# Patient Record
Sex: MALE | Race: WHITE | NOT HISPANIC OR LATINO | Employment: OTHER | URBAN - METROPOLITAN AREA
[De-identification: names, ages, dates, MRNs, and addresses within clinical notes are randomized per-mention and may not be internally consistent; named-entity substitution may affect disease eponyms.]

---

## 2019-11-01 ENCOUNTER — HOSPITAL ENCOUNTER (OUTPATIENT)
Dept: RADIOLOGY | Facility: MEDICAL CENTER | Age: 69
End: 2019-11-01

## 2019-12-11 ENCOUNTER — HOSPITAL ENCOUNTER (OUTPATIENT)
Dept: HOSPITAL 8 - CVU | Age: 69
Discharge: HOME | End: 2019-12-11
Attending: INTERNAL MEDICINE
Payer: MEDICARE

## 2019-12-11 DIAGNOSIS — I35.8: Primary | ICD-10-CM

## 2019-12-11 DIAGNOSIS — Z87.891: ICD-10-CM

## 2019-12-11 PROCEDURE — 93306 TTE W/DOPPLER COMPLETE: CPT

## 2020-01-23 ENCOUNTER — HOSPITAL ENCOUNTER (OUTPATIENT)
Dept: HOSPITAL 8 - CFH | Age: 70
Discharge: HOME | End: 2020-01-23
Attending: INTERNAL MEDICINE
Payer: MEDICARE

## 2020-01-23 DIAGNOSIS — R93.1: ICD-10-CM

## 2020-01-23 DIAGNOSIS — R07.89: Primary | ICD-10-CM

## 2020-01-23 PROCEDURE — 93017 CV STRESS TEST TRACING ONLY: CPT

## 2020-01-23 PROCEDURE — 78452 HT MUSCLE IMAGE SPECT MULT: CPT

## 2020-01-23 PROCEDURE — A9502 TC99M TETROFOSMIN: HCPCS

## 2020-03-09 ENCOUNTER — HOSPITAL ENCOUNTER (OUTPATIENT)
Facility: MEDICAL CENTER | Age: 70
End: 2020-03-09
Attending: SURGERY | Admitting: SURGERY
Payer: MEDICARE

## 2025-01-20 NOTE — PROGRESS NOTES
Subjective  Elder South is a 74 y.o. male who presents today for gross hematuria for 2 days at the end of 2024. No infection at the time. Had kidney stone treated 3 years ago. Had some left CVAT at the time of the hematuria. Has a PSA between 5 and 6 for a couple years.     No family history on file.    Social History     Socioeconomic History    Marital status:    Tobacco Use    Smoking status: Former     Current packs/day: 0.00     Average packs/day: 0.5 packs/day for 40.0 years (20.0 ttl pk-yrs)     Types: Cigarettes     Start date: 1973     Quit date: 2013     Years since quittin.5   Substance and Sexual Activity    Alcohol use: Yes     Comment: 5 per week     Social Drivers of Health     Transportation Needs: No Transportation Needs (2020)    Received from [facility]    PRAPARE - Transportation     Lack of Transportation (Medical): No     Lack of Transportation (Non-Medical): No   Physical Activity: Insufficiently Active (2020)    Received from [facility]    Exercise Vital Sign     Days of Exercise per Week: 3 days     Minutes of Exercise per Session: 10 min       Past Surgical History:   Procedure Laterality Date    CYSTOSCOPY STENT PLACEMENT Left 2016    Procedure: CYSTOSCOPY POSSIBLE STENT PLACEMENT;  Surgeon: Rene Benson M.D.;  Location: [facility];  Service:     URETEROSCOPY Left 2016    Procedure: URETEROSCOPY;  Surgeon: Rene Benson M.D.;  Location: [facility];  Service:     LASERTRIPSY Left 2016    Procedure: LASER LITHOTRIPSY;  Surgeon: Rene Benson M.D.;  Location: [facility];  Service:     OTHER ORTHOPEDIC SURGERY      right heel fx       Past Medical History:   Diagnosis Date    Arthritis     spine    Blood clotting disorder     on blood thinners    Cancer     skin ca basal cell    Cataract     Diverticul disease small and large intestine, no perforati or abscess      Heart burn     Heart valve disease     enlarged right atrium    Hemorrhagic disorder (CMS-HCC) 2010    DVT- left groin    Renal disorder     Urinary bladder disorder        Current Outpatient Medications   Medication Sig    Cholecalciferol 4000 UNITS Tab Take 4,000 Units by mouth every day.    warfarin (COUMADIN) 1 MG Tab Take 3.5 mg by mouth every day.    S-Adenosylmethionine (MATEO-E) 400 MG Tab Take 1 Tab by mouth every day.    Multiple Vitamin (MULTI-VITAMIN PO) Take 1 Tab by mouth every day.    Calcium Carbonate (CALCIUM 600 PO) Take 1 Tab by mouth every day.    alprazolam (XANAX) 1 MG Tab Take 0.5-1 mg by mouth at bedtime as needed for Sleep.    zolpidem (AMBIEN) 10 MG Tab Take 10 mg by mouth at bedtime as needed for Sleep.    temazepam (RESTORIL) 30 MG capsule Take 30 mg by mouth at bedtime as needed for Sleep.    hydrocodone-acetaminophen (VICODIN) 5-500 MG Tab Take 1-2 Tabs by mouth every 8 hours as needed (Old Rx.).    tamsulosin (FLOMAX) 0.4 MG capsule Take 0.4 mg by mouth every bedtime.       No Known Allergies    Objective  There were no vitals taken for this visit.  Physical Exam      Labs:   CBC   Lab Results   Component Value Date/Time    WBC 7.8 07/08/2016 1452    RBC 5.42 07/08/2016 1452    HEMOGLOBIN 17.3 07/08/2016 1452    HEMATOCRIT 52.5 (H) 07/08/2016 1452    MCV 96.9 07/08/2016 1452    MCH 31.9 07/08/2016 1452    MCHC 33.0 (L) 07/08/2016 1452    RDW 49.3 07/08/2016 1452    MPV 9.6 07/08/2016 1452         Imaging:   None relevant      Assessment & Plan    Due to presence of gross hematuria they are in the high risk category.  We discussed that the recommendation would be for a CT urogram and cystoscopy to evaluate the upper tracts and the bladder for possible malignancy.  We reviewed the various causes of hematuria including benign or malignant conditions such as kidney stones, urinary tract infection, kidney cancer, bladder cancer, etc. We reviewed the risks and benefits of the cystoscopy  procedure including hematuria, dysuria, and urinary tract infection.  The patient indicated their understanding and agreement with this plan.     Will get 4k score today, then RTC for cysto. BMP and CT urogram ordered for [place].     John Kc M.D., F.A.C.S.  Urologic Oncology  Vice-Chair, Department of Surgery  [facility]

## 2025-01-23 ENCOUNTER — OFFICE VISIT (OUTPATIENT)
Dept: UROLOGY | Facility: MEDICAL CENTER | Age: 75
End: 2025-01-23
Payer: MEDICARE

## 2025-01-23 ENCOUNTER — HOSPITAL ENCOUNTER (OUTPATIENT)
Dept: LAB | Facility: MEDICAL CENTER | Age: 75
End: 2025-01-23
Attending: UROLOGY
Payer: MEDICARE

## 2025-01-23 DIAGNOSIS — N20.1 CALCULUS OF URETER: ICD-10-CM

## 2025-01-23 LAB
ANION GAP SERPL CALC-SCNC: 13 MMOL/L (ref 7–16)
APPEARANCE UR: CLEAR
BILIRUB UR STRIP-MCNC: NORMAL MG/DL
BUN SERPL-MCNC: 19 MG/DL (ref 8–22)
CALCIUM SERPL-MCNC: 9.5 MG/DL (ref 8.5–10.5)
CHLORIDE SERPL-SCNC: 103 MMOL/L (ref 96–112)
CO2 SERPL-SCNC: 25 MMOL/L (ref 20–33)
COLOR UR AUTO: YELLOW
CREAT SERPL-MCNC: 0.9 MG/DL (ref 0.5–1.4)
GFR SERPLBLD CREATININE-BSD FMLA CKD-EPI: 90 ML/MIN/1.73 M 2
GLUCOSE SERPL-MCNC: 90 MG/DL (ref 65–99)
GLUCOSE UR STRIP.AUTO-MCNC: NORMAL MG/DL
KETONES UR STRIP.AUTO-MCNC: NORMAL MG/DL
LEUKOCYTE ESTERASE UR QL STRIP.AUTO: NORMAL
NITRITE UR QL STRIP.AUTO: NORMAL
PH UR STRIP.AUTO: 6.5 [PH] (ref 5–8)
POTASSIUM SERPL-SCNC: 4.4 MMOL/L (ref 3.6–5.5)
PROT UR QL STRIP: NORMAL MG/DL
RBC UR QL AUTO: NORMAL
SODIUM SERPL-SCNC: 141 MMOL/L (ref 135–145)
SP GR UR STRIP.AUTO: 1.02
UROBILINOGEN UR STRIP-MCNC: 0.2 MG/DL

## 2025-01-23 PROCEDURE — 80048 BASIC METABOLIC PNL TOTAL CA: CPT

## 2025-01-23 PROCEDURE — 81539 ONCOLOGY PROSTATE PROB SCORE: CPT

## 2025-01-23 PROCEDURE — 99202 OFFICE O/P NEW SF 15 MIN: CPT | Performed by: UROLOGY

## 2025-01-23 PROCEDURE — 36415 COLL VENOUS BLD VENIPUNCTURE: CPT

## 2025-01-23 PROCEDURE — 81002 URINALYSIS NONAUTO W/O SCOPE: CPT | Performed by: UROLOGY

## 2025-01-23 NOTE — PROGRESS NOTES
Subjective  Elder South is a 74 y.o. male who presents today for cystoscopy to evaluate Gross Hematuria.     No family history on file.    Social History     Socioeconomic History    Marital status:    Tobacco Use    Smoking status: Former     Current packs/day: 0.00     Average packs/day: 0.5 packs/day for 40.0 years (20.0 ttl pk-yrs)     Types: Cigarettes     Start date: 1973     Quit date: 2013     Years since quittin.5   Substance and Sexual Activity    Alcohol use: Yes     Comment: 5 per week     Social Drivers of Health     Transportation Needs: No Transportation Needs (2020)    Received from [facility]    PRAPARE - Transportation     Lack of Transportation (Medical): No     Lack of Transportation (Non-Medical): No   Physical Activity: Insufficiently Active (2020)    Received from [facility]    Exercise Vital Sign     Days of Exercise per Week: 3 days     Minutes of Exercise per Session: 10 min       Past Surgical History:   Procedure Laterality Date    CYSTOSCOPY STENT PLACEMENT Left 2016    Procedure: CYSTOSCOPY POSSIBLE STENT PLACEMENT;  Surgeon: Rene Benson M.D.;  Location: SURGERY [facility];  Service:     URETEROSCOPY Left 2016    Procedure: URETEROSCOPY;  Surgeon: Rene Benson M.D.;  Location: SURGERY [facility];  Service:     LASERTRIPSY Left 2016    Procedure: LASER LITHOTRIPSY;  Surgeon: Rene Benson M.D.;  Location: SURGERY [facility];  Service:     OTHER ORTHOPEDIC SURGERY      right heel fx       Past Medical History:   Diagnosis Date    Arthritis     spine    Blood clotting disorder     on blood thinners    Cancer     skin ca basal cell    Cataract     Diverticul disease small and large intestine, no perforati or abscess     Heart burn     Heart valve disease     enlarged right atrium    Hemorrhagic disorder (CMS-HCC)     DVT- left groin    Renal disorder     Urinary bladder disorder   "      Current Outpatient Medications   Medication Sig    Cholecalciferol 4000 UNITS Tab Take 4,000 Units by mouth every day.    warfarin (COUMADIN) 1 MG Tab Take 3.5 mg by mouth every day.    S-Adenosylmethionine (MATEO-E) 400 MG Tab Take 1 Tab by mouth every day.    Multiple Vitamin (MULTI-VITAMIN PO) Take 1 Tab by mouth every day.    Calcium Carbonate (CALCIUM 600 PO) Take 1 Tab by mouth every day.    alprazolam (XANAX) 1 MG Tab Take 0.5-1 mg by mouth at bedtime as needed for Sleep.    zolpidem (AMBIEN) 10 MG Tab Take 10 mg by mouth at bedtime as needed for Sleep.    temazepam (RESTORIL) 30 MG capsule Take 30 mg by mouth at bedtime as needed for Sleep.    hydrocodone-acetaminophen (VICODIN) 5-500 MG Tab Take 1-2 Tabs by mouth every 8 hours as needed (Old Rx.).    tamsulosin (FLOMAX) 0.4 MG capsule Take 0.4 mg by mouth every bedtime.       No Known Allergies    Objective  There were no vitals taken for this visit.  Physical Exam      Labs:     POC UA  Lab Results   Component Value Date/Time    POCCOLOR yellow 01/23/2025 12:56 PM    POCAPPEAR clear 01/23/2025 12:56 PM    POCLEUKEST neg 01/23/2025 12:56 PM    POCNITRITE neg 01/23/2025 12:56 PM    POCUROBILIGE 0.2 01/23/2025 12:56 PM    POCPROTEIN neg 01/23/2025 12:56 PM    POCURPH 6.5 01/23/2025 12:56 PM    POCBLOOD neg 01/23/2025 12:56 PM    POCSPGRV 1.020 01/23/2025 12:56 PM    POCKETONES neg 01/23/2025 12:56 PM    POCBILIRUBIN neg 01/23/2025 12:56 PM    POCGLUCUA neg 01/23/2025 12:56 PM        CMP No results found for: \"SODIUM\", \"POTASSIUM\", \"CHLORIDE\", \"CO2\", \"ANION\", \"GLUCOSE\", \"BUN\", \"CREATININE\", \"GFRCKD\", \"CALCIUM\", \"CORRCALC\", \"ASTSGOT\", \"ALTSGPT\", \"ALKPHOSPHAT\", \"TBILIRUBIN\", \"ALBUMIN\", \"TOTPROTEIN\", \"GLOBULIN\", \"AGRATIO\", \"GLYCOHEM\"    BMP  No results found for: \"SODIUM\", \"POTASSIUM\", \"CHLORIDE\", \"CO2\", \"GLUCOSE\", \"BUN\", \"CREATININE\", \"CALCIUM\"    CBC  Lab Results   Component Value Date/Time    WBC 7.8 07/08/2016 1452    RBC 5.42 07/08/2016 1452    " "HEMOGLOBIN 17.3 07/08/2016 1452    HEMATOCRIT 52.5 (H) 07/08/2016 1452    MCV 96.9 07/08/2016 1452    MCH 31.9 07/08/2016 1452    MCHC 33.0 (L) 07/08/2016 1452    RDW 49.3 07/08/2016 1452    MPV 9.6 07/08/2016 1452       A1C  No results found for: \"HBA1C\", \"AVGLUC\"    Procedure    Procedure performed: Cystoscopy       Surgeon: Dr. Lb Kc    Indications For Procedure: Gross Hematuria    Blood Loss: 0 cc     Anesthesia: Lidocaine jelly     Specimen: None     Findings:     1. Urethra: no lesions or masses    2. Bladder: no lesions or masses, no stones, no trabeculations    3. Bilateral ureteral jets observed with clear efflux      4. Prostate: moderate lateral lobe hypertrophy, no median lobe, 4 cm prostate length     Description of Procedure: The patient was prepped and draped in the usual sterile fashion.  A 17Fr flexible cystoscope was advanced along the urethra into the bladder under direct vision.  We performed a thorough cystoscopic evaluation patient's bladder including retroflexion, findings noted above.  We removed the cystoscope under direct vision to evaluate the urethra and prostate, findings noted above.  This concluded the procedure, the patient tolerated it well.     Assessment  Patient was instructed to call our office if he develops any signs of infection including increased frequency, urgency, dysuria, fever, or chills.  We discussed the results of the cystoscopy with the patient, all questions and concerns addressed.     Plan    Gross hematuria. CT urogram ordered locally.       John Kc M.D., F.A.C.S.  Urologic Oncology  Vice-Chair, Department of Surgery  [facility]        "

## 2025-02-03 DIAGNOSIS — N20.1 CALCULUS OF URETER: ICD-10-CM

## 2025-02-04 ENCOUNTER — TELEPHONE (OUTPATIENT)
Dept: UROLOGY | Facility: MEDICAL CENTER | Age: 75
End: 2025-02-04
Payer: MEDICARE

## 2025-02-04 DIAGNOSIS — N20.1 URETERAL CALCULUS, LEFT: ICD-10-CM

## 2025-02-04 RX ORDER — TAMSULOSIN HYDROCHLORIDE 0.4 MG/1
0.4 CAPSULE ORAL
Qty: 30 CAPSULE | Refills: 6 | Status: SHIPPED | OUTPATIENT
Start: 2025-02-04

## 2025-02-04 NOTE — TELEPHONE ENCOUNTER
I called Mr. South and told him the results of his outside CT urogram.  He has a distal left ureteral 5 mm calculus 3 cm from the bladder.  He is not having any symptoms.  This may have been the cause for his hematuria.  I put him on tamsulosin 0.4 mg daily to help with passing the stone.  He has significant pain or fever he will contact us.    John Kc M.D., F.A.C.S.  Urologic Oncology  Vice-Chair, Department of Surgery  [facility]

## 2025-02-05 LAB
4K - PSA, FREE Q5895: 1.09 NG/ML
4K - PSA, TOTAL Q5894: 3.5 NG/ML
4K - SCORE Q5892: 4.7
4K -PSA, PERCENT FREE Q5896: 31 %

## 2025-02-19 ENCOUNTER — TELEPHONE (OUTPATIENT)
Dept: UROLOGY | Facility: MEDICAL CENTER | Age: 75
End: 2025-02-19
Payer: MEDICARE

## 2025-02-19 DIAGNOSIS — N20.1 URETERAL CALCULUS, LEFT: ICD-10-CM

## 2025-02-19 RX ORDER — HYDROCODONE BITARTRATE AND ACETAMINOPHEN 5; 325 MG/1; MG/1
1 TABLET ORAL EVERY 4 HOURS PRN
Qty: 20 TABLET | Refills: 0 | Status: SHIPPED | OUTPATIENT
Start: 2025-02-19 | End: 2025-02-20

## 2025-02-19 NOTE — TELEPHONE ENCOUNTER
Patient called in to state that he believes that his kidney stone is moving and he is in Px, so he would like you to prescribe something for the Px.

## 2025-02-19 NOTE — TELEPHONE ENCOUNTER
Spoke to patient and he is now stating that he thinks the stone may be blocking some of his urine flow and he also states that there are blood clots in his strainer. All with in the last few hours. Urine flow is not normal.

## 2025-02-20 ENCOUNTER — TELEPHONE (OUTPATIENT)
Dept: UROLOGY | Facility: MEDICAL CENTER | Age: 75
End: 2025-02-20
Payer: MEDICARE

## 2025-02-20 DIAGNOSIS — N20.1 URETERAL CALCULI: ICD-10-CM

## 2025-02-20 RX ORDER — ACETAMINOPHEN AND CODEINE PHOSPHATE 300; 60 MG/1; MG/1
1 TABLET ORAL EVERY 4 HOURS PRN
Qty: 30 TABLET | Refills: 0 | Status: SHIPPED | OUTPATIENT
Start: 2025-02-20 | End: 2025-03-07

## 2025-02-21 NOTE — TELEPHONE ENCOUNTER
Patient called in to state that the Rx you sent to his [place] pharmacy is not allowed to fill that controlled substance in the [facility]? He said you could sent Tramadol or Tylenol with Codine. Please advise and I will call him back.

## 2025-04-04 ENCOUNTER — TELEPHONE (OUTPATIENT)
Dept: UROLOGY | Facility: MEDICAL CENTER | Age: 75
End: 2025-04-04

## 2025-04-04 ENCOUNTER — OFFICE VISIT (OUTPATIENT)
Dept: UROLOGY | Facility: MEDICAL CENTER | Age: 75
End: 2025-04-04
Payer: MEDICARE

## 2025-04-04 ENCOUNTER — HOSPITAL ENCOUNTER (OUTPATIENT)
Dept: RADIOLOGY | Facility: MEDICAL CENTER | Age: 75
End: 2025-04-04
Attending: STUDENT IN AN ORGANIZED HEALTH CARE EDUCATION/TRAINING PROGRAM
Payer: MEDICARE

## 2025-04-04 DIAGNOSIS — N20.1 URETERAL CALCULI: ICD-10-CM

## 2025-04-04 DIAGNOSIS — N20.1 URETERAL CALCULUS, LEFT: ICD-10-CM

## 2025-04-04 DIAGNOSIS — N20.1 CALCULUS OF URETER: ICD-10-CM

## 2025-04-04 PROCEDURE — 99214 OFFICE O/P EST MOD 30 MIN: CPT | Performed by: STUDENT IN AN ORGANIZED HEALTH CARE EDUCATION/TRAINING PROGRAM

## 2025-04-04 PROCEDURE — 74176 CT ABD & PELVIS W/O CONTRAST: CPT

## 2025-04-04 NOTE — TELEPHONE ENCOUNTER
Spoke with patient, stone passed no need for surgical intervention. We will plan to get a renal ultrasound in 6 weeks. Discussed simple stone prevention techniques in regards to dietary changes.

## 2025-04-04 NOTE — PROGRESS NOTES
Subjective  Elder South is a 74 y.o. male who presents today for follow up of nephrolithiasis.    The patient has some discomfort at the tip of his penis, but otherwise no flank pain or symptoms of UTI. He does not believe he passed the stone. He has not had abdominal imaging since 2025    No family history on file.    Social History     Socioeconomic History    Marital status:      Spouse name: Not on file    Number of children: Not on file    Years of education: Not on file    Highest education level: Not on file   Occupational History    Not on file   Tobacco Use    Smoking status: Former     Current packs/day: 0.00     Average packs/day: 0.5 packs/day for 40.0 years (20.0 ttl pk-yrs)     Types: Cigarettes     Start date: 1973     Quit date: 2013     Years since quittin.7    Smokeless tobacco: Not on file   Substance and Sexual Activity    Alcohol use: Yes     Comment: 5 per week    Drug use: Not on file    Sexual activity: Not on file   Other Topics Concern    Not on file   Social History Narrative    Not on file     Social Drivers of Health     Financial Resource Strain: Not on file   Food Insecurity: Not on file   Transportation Needs: No Transportation Needs (2020)    Received from [facility]    PRAPARE - Transportation     Lack of Transportation (Medical): No     Lack of Transportation (Non-Medical): No   Physical Activity: Insufficiently Active (2020)    Received from [facility]    Exercise Vital Sign     Days of Exercise per Week: 3 days     Minutes of Exercise per Session: 10 min   Stress: Not on file   Social Connections: Not on file   Intimate Partner Violence: Not on file   Housing Stability: Not on file       Past Surgical History:   Procedure Laterality Date    CYSTOSCOPY STENT PLACEMENT Left 2016    Procedure: CYSTOSCOPY POSSIBLE STENT PLACEMENT;  Surgeon: Rene Benson M.D.;  [place] [facility]  Service:      URETEROSCOPY Left 7/8/2016    Procedure: URETEROSCOPY;  Surgeon: Rene Benson M.D.;  Location: SURGERY [facility];  Service:     LASERTRIPSY Left 7/8/2016    Procedure: LASER LITHOTRIPSY;  Surgeon: Rene Benson M.D.;  Location: SURGERY [facility];  Service:     OTHER ORTHOPEDIC SURGERY      right heel fx       Past Medical History:   Diagnosis Date    Arthritis     spine    Blood clotting disorder (HCC)     on blood thinners    Cancer (HCC)     skin ca basal cell    Cataract     Diverticul disease small and large intestine, no perforati or abscess     Heart burn     Heart valve disease     enlarged right atrium    Hemorrhagic disorder (HCC) 2010    DVT- left groin    Renal disorder     Urinary bladder disorder        Current Outpatient Medications   Medication Sig Dispense Refill    tamsulosin (FLOMAX) 0.4 MG capsule Take 1 Capsule by mouth 1/2 hour after breakfast. 30 Capsule 6    Cholecalciferol 4000 UNITS Tab Take 4,000 Units by mouth every day.      warfarin (COUMADIN) 1 MG Tab Take 3.5 mg by mouth every day.      S-Adenosylmethionine (MATEO-E) 400 MG Tab Take 1 Tab by mouth every day.      Multiple Vitamin (MULTI-VITAMIN PO) Take 1 Tab by mouth every day.      Calcium Carbonate (CALCIUM 600 PO) Take 1 Tab by mouth every day.      alprazolam (XANAX) 1 MG Tab Take 0.5-1 mg by mouth at bedtime as needed for Sleep.      zolpidem (AMBIEN) 10 MG Tab Take 10 mg by mouth at bedtime as needed for Sleep.      temazepam (RESTORIL) 30 MG capsule Take 30 mg by mouth at bedtime as needed for Sleep.      tamsulosin (FLOMAX) 0.4 MG capsule Take 0.4 mg by mouth every bedtime.       No current facility-administered medications for this visit.       No Known Allergies    Objective  There were no vitals taken for this visit.  Physical Exam  Constitutional:       Appearance: Normal appearance.   HENT:      Head: Normocephalic and atraumatic.   Eyes:      Extraocular Movements: Extraocular movements intact.       Conjunctiva/sclera: Conjunctivae normal.   Pulmonary:      Effort: No respiratory distress.   Musculoskeletal:      Cervical back: Normal range of motion.   Skin:     General: Skin is warm and dry.   Neurological:      General: No focal deficit present.      Mental Status: He is alert.   Psychiatric:         Mood and Affect: Mood normal.         Labs:   CBC   Lab Results   Component Value Date/Time    WBC 7.8 07/08/2016 1452    RBC 5.42 07/08/2016 1452    HEMOGLOBIN 17.3 07/08/2016 1452    HEMATOCRIT 52.5 (H) 07/08/2016 1452    MCV 96.9 07/08/2016 1452    MCH 31.9 07/08/2016 1452    MCHC 33.0 (L) 07/08/2016 1452    RDW 49.3 07/08/2016 1452    MPV 9.6 07/08/2016 1452       BMP   Lab Results   Component Value Date/Time    SODIUM 141 01/23/2025 1343    POTASSIUM 4.4 01/23/2025 1343    CHLORIDE 103 01/23/2025 1343    CO2 25 01/23/2025 1343    GLUCOSE 90 01/23/2025 1343    BUN 19 01/23/2025 1343    CREATININE 0.90 01/23/2025 1343    CALCIUM 9.5 01/23/2025 1343           Imaging:     CT AP 01/2025: 5 mm distal left ureteral stone    Assessment    Nephrolithiasis  -We will get CT renal colic today, if the stone is still present we will proceed with ureteroscopy and laser lithotripsy. We discussed how the procedure is done, risks/benefits, what to expect for recovery. Patient agrees with plan    Plan    Problem List Items Addressed This Visit    None  Visit Diagnoses         Ureteral calculi        Relevant Orders    CT-RENAL COLIC EVALUATION(A/P W/O)      Ureteral calculus, left        Relevant Orders    CT-RENAL COLIC EVALUATION(A/P W/O)          Will contact with CT results when report available